# Patient Record
(demographics unavailable — no encounter records)

---

## 2025-07-12 NOTE — HISTORY OF PRESENT ILLNESS
[FreeTextEntry1] : New elevated PSA.  PSA 8.44.  Patient lost to follow-up secondary to COVID.  Reviewed PSA.  MRI.  Fusion biopsy.  Please refer to [facility] Consult note

## 2025-07-12 NOTE — LETTER BODY
[FreeTextEntry1] : Evelio Davis MD [address], [address] [phone]  Dear Dr Davis,   Reason for Visit: Elevated PSA.     This is a 79 year-old male with elevated PSA. The patient was last seen by me over 6 years ago but was lost to follow up due to COVID. Patient reports that he has not had previous prostate biopsy. His current PSA is 8.44. Patient denies any hematuria or lower urinary tract symptoms. He denies any pain. The patient denies any aggravating or relieving factors. The patient denies any interference of function. The patient is entirely asymptomatic. All other review of systems are negative. He has no cancer in his family medical history. He has no previous surgical history. Past medical history, family history and social history were inquired and were noncontributory to current condition. The patient does not use tobacco or drink alcohol. Medications and allergies were reviewed. He has no known allergies to medication.   On examination, the patient is a well-appearing male in no acute distress. He is alert and oriented follows commands. He has normal mood and affect. He is normocephalic. Neck is supple. Oral no thrush. Respirations are unlabored. His abdomen is soft and nontender. Bladder is nonpalpable. No CVA tenderness. Neurologically he is grossly intact. No peripheral edema. Skin without gross abnormality.     Assessment: Elevated PSA.     I counseled the patient. I reviewed his PSA. I discussed with him the risk of occult malignancy. I recommended he obtain a prostate MRI for fusion targeted prostate biopsy. Risks and alternatives were discussed. I answered the patient questions. He will take the necessary preparations for the procedure, including fleet enema. He will repeat PSA and BMP today to establish baselines. The patient will follow-up as directed and will contact me with any questions or concerns. Thank you for the opportunity to participate in the care of Mr. ROBLEDO. I will keep you updated on his progress.     Plan: Prostate MRI. PSA. BMP.  Fusion biopsy.  Follow up as directed.

## 2025-07-12 NOTE — ADDENDUM
[FreeTextEntry1] : Entered by Katerin Yanez, acting as scribe for Dr Wyatt Aguirre. The documentation recorded by the scribe accurately reflects the service I personally performed and the decisions made by me.

## 2025-07-12 NOTE — LETTER BODY
[FreeTextEntry1] : Evelio Davis MD [address], [address] [phone]  Dear Dr Davis,   Reason for Visit: Elevated PSA.     This is a 79 year-old male with elevated PSA. The patient was last seen by me over 6 years ago but was lost to follow up due to COVID. Patient reports that he has not had previous prostate biopsy. His current PSA is 8.44. Patient denies any hematuria or lower urinary tract symptoms. He denies any pain. The patient denies any aggravating or relieving factors. The patient denies any interference of function. The patient is entirely asymptomatic. All other review of systems are negative. He has no cancer in his family medical history. He has no previous surgical history. Past medical history, family history and social history were inquired and were noncontributory to current condition. The patient does not use tobacco or drink alcohol. Medications and allergies were reviewed. He has no known allergies to medication.   On examination, the patient is a well-appearing male in no acute distress. He is alert and oriented follows commands. He has normal mood and affect. He is normocephalic. Neck is supple. Oral no thrush. Respirations are unlabored. His abdomen is soft and nontender. Bladder is nonpalpable. No CVA tenderness. Neurologically he is grossly intact. No peripheral edema. Skin without gross abnormality.     Assessment: Elevated PSA.     I counseled the patient. I reviewed his PSA. I discussed with him the risk of occult malignancy. I recommended he obtain a prostate MRI for fusion targeted prostate biopsy. Risks and alternatives were discussed. I answered the patient questions. He will take the necessary preparations for the procedure, including fleet enema. He will repeat PSA and BMP today to establish baselines. The patient will follow-up as directed and will contact me with any questions or concerns. Thank you for the opportunity to participate in the care of Mr. ROBLEDO. I will keep you updated on his progress.     Plan: Prostate MRI. PSA. BMP.  Fusion biopsy.  Follow up as directed.